# Patient Record
Sex: MALE | Race: WHITE | NOT HISPANIC OR LATINO | URBAN - METROPOLITAN AREA
[De-identification: names, ages, dates, MRNs, and addresses within clinical notes are randomized per-mention and may not be internally consistent; named-entity substitution may affect disease eponyms.]

---

## 2022-02-11 ENCOUNTER — APPOINTMENT (RX ONLY)
Dept: URBAN - METROPOLITAN AREA CLINIC 15 | Facility: CLINIC | Age: 42
Setting detail: DERMATOLOGY
End: 2022-02-11

## 2022-02-11 VITALS — WEIGHT: 230 LBS | HEIGHT: 72 IN

## 2022-02-11 DIAGNOSIS — L21.8 OTHER SEBORRHEIC DERMATITIS: ICD-10-CM

## 2022-02-11 DIAGNOSIS — L40.0 PSORIASIS VULGARIS: ICD-10-CM

## 2022-02-11 DIAGNOSIS — D22 MELANOCYTIC NEVI: ICD-10-CM

## 2022-02-11 DIAGNOSIS — L64.8 OTHER ANDROGENIC ALOPECIA: ICD-10-CM

## 2022-02-11 PROBLEM — D22.5 MELANOCYTIC NEVI OF TRUNK: Status: ACTIVE | Noted: 2022-02-11

## 2022-02-11 PROBLEM — D22.71 MELANOCYTIC NEVI OF RIGHT LOWER LIMB, INCLUDING HIP: Status: ACTIVE | Noted: 2022-02-11

## 2022-02-11 PROCEDURE — ? PRESCRIPTION

## 2022-02-11 PROCEDURE — ? COUNSELING

## 2022-02-11 PROCEDURE — 99204 OFFICE O/P NEW MOD 45 MIN: CPT

## 2022-02-11 PROCEDURE — ? TREATMENT REGIMEN

## 2022-02-11 PROCEDURE — ? PRESCRIPTION MEDICATION MANAGEMENT

## 2022-02-11 PROCEDURE — ? ORDER TESTS

## 2022-02-11 RX ORDER — FLUOCINONIDE 0.5 MG/G
1 CREAM TOPICAL
Qty: 60 | Refills: 1 | COMMUNITY
Start: 2022-02-11

## 2022-02-11 RX ORDER — KETOCONAZOLE 20 MG/G
CREAM TOPICAL QHS
Qty: 60 | Refills: 2 | COMMUNITY
Start: 2022-02-11

## 2022-02-11 RX ADMIN — KETOCONAZOLE: 20 CREAM TOPICAL at 00:00

## 2022-02-11 RX ADMIN — FLUOCINONIDE 1: 0.5 CREAM TOPICAL at 00:00

## 2022-02-11 ASSESSMENT — LOCATION SIMPLE DESCRIPTION DERM
LOCATION SIMPLE: RIGHT FOREARM
LOCATION SIMPLE: LEFT PRETIBIAL REGION
LOCATION SIMPLE: RIGHT UPPER BACK
LOCATION SIMPLE: LEFT FOREARM
LOCATION SIMPLE: LEFT EYEBROW
LOCATION SIMPLE: RIGHT PRETIBIAL REGION
LOCATION SIMPLE: RIGHT THIGH
LOCATION SIMPLE: RIGHT SUPERIOR EYELID

## 2022-02-11 ASSESSMENT — LOCATION ZONE DERM
LOCATION ZONE: EYELID
LOCATION ZONE: TRUNK
LOCATION ZONE: FACE
LOCATION ZONE: ARM
LOCATION ZONE: LEG

## 2022-02-11 ASSESSMENT — LOCATION DETAILED DESCRIPTION DERM
LOCATION DETAILED: RIGHT PROXIMAL PRETIBIAL REGION
LOCATION DETAILED: RIGHT ANTERIOR DISTAL THIGH
LOCATION DETAILED: RIGHT PROXIMAL DORSAL FOREARM
LOCATION DETAILED: LEFT PROXIMAL PRETIBIAL REGION
LOCATION DETAILED: LEFT PROXIMAL DORSAL FOREARM
LOCATION DETAILED: RIGHT MEDIAL UPPER BACK
LOCATION DETAILED: RIGHT LATERAL SUPERIOR EYELID
LOCATION DETAILED: LEFT CENTRAL EYEBROW

## 2022-02-11 ASSESSMENT — SEVERITY ASSESSMENT: HOW SEVERE IS THIS PATIENT'S CONDITION?: MILD

## 2022-02-11 ASSESSMENT — BSA PSORIASIS: % BODY COVERED IN PSORIASIS: 20

## 2022-02-11 NOTE — PROCEDURE: ORDER TESTS
Bill For Surgical Tray: no
Billing Type: [employer]
Performing Laboratory: 0
Expected Date Of Service: 02/11/2022

## 2022-02-11 NOTE — PROCEDURE: COUNSELING
Detail Level: Generalized
Topical Steroid Recommendations: Hydrocortisone 2.5% cream
Shampoo Recommendations: Ketoconazole shampoo
Topical Antifungal Recommendations: Ketoconazole cream
Detail Level: Zone

## 2022-02-11 NOTE — PROCEDURE: PRESCRIPTION MEDICATION MANAGEMENT
Initiate Treatment: .\\nRogaine 5% solution or foam. Apply to affected areas of the scalp once a day. OTC\\n.
Detail Level: Zone
Render In Strict Bullet Format?: No

## 2022-02-11 NOTE — PROCEDURE: TREATMENT REGIMEN
Detail Level: Zone
Plan to start taltz as patient also has joint pain, has seen rheumatologist in past and diagnosed with psoriatic arthritis
Plan: Ketoconazole cream as needed one to two times daily